# Patient Record
Sex: MALE | Race: BLACK OR AFRICAN AMERICAN
[De-identification: names, ages, dates, MRNs, and addresses within clinical notes are randomized per-mention and may not be internally consistent; named-entity substitution may affect disease eponyms.]

---

## 2018-11-05 ENCOUNTER — HOSPITAL ENCOUNTER (EMERGENCY)
Dept: HOSPITAL 74 - JER | Age: 23
Discharge: LEFT BEFORE DISCHARGE | End: 2018-11-05
Payer: COMMERCIAL

## 2018-11-05 VITALS — DIASTOLIC BLOOD PRESSURE: 68 MMHG | SYSTOLIC BLOOD PRESSURE: 114 MMHG | HEART RATE: 68 BPM | TEMPERATURE: 98.2 F

## 2018-11-05 VITALS — BODY MASS INDEX: 22.3 KG/M2

## 2018-11-05 DIAGNOSIS — Z53.21: Primary | ICD-10-CM

## 2019-10-28 ENCOUNTER — HOSPITAL ENCOUNTER (EMERGENCY)
Dept: HOSPITAL 74 - JERFT | Age: 24
Discharge: HOME | End: 2019-10-28
Payer: COMMERCIAL

## 2019-10-28 VITALS — HEART RATE: 62 BPM | SYSTOLIC BLOOD PRESSURE: 105 MMHG | TEMPERATURE: 98.2 F | DIASTOLIC BLOOD PRESSURE: 59 MMHG

## 2019-10-28 VITALS — BODY MASS INDEX: 20.9 KG/M2

## 2019-10-28 DIAGNOSIS — L70.0: Primary | ICD-10-CM

## 2019-10-28 DIAGNOSIS — R51: ICD-10-CM

## 2019-10-28 NOTE — PDOC
History of Present Illness





- General


Chief Complaint: Wound


Stated Complaint: BOIL


Time Seen by Provider: 10/28/19 16:15


History Source: Patient


Exam Limitations: No Limitations





- History of Present Illness


Initial Comments: 





10/28/19 17:07


24 year old male with significant medical or surgical history presents to 

emergency room for lesion to right side of face x 2 1/2 months.  Patient 

reports no fever or chills, States area appears to becoming more infected.  

Using no medication so far.  


Timing/Duration: reports: other (2-3 months)


Severity: Yes: mild


Location: reports: face


Respiratory Risk Factors: reports: no cause identified


Modifying Factors: improves with: scratching


Associated Symptoms: reports: swelling/mass/lumps (to right side of face)





Past History





- Travel


Traveled outside of the country in the last 30 days: No


Close contact w/someone who was outside of country & ill: No





- Past Medical History


Allergies/Adverse Reactions: 


 Allergies











Allergy/AdvReac Type Severity Reaction Status Date / Time


 


No Known Allergies Allergy   Verified 10/28/19 16:17











Home Medications: 


Ambulatory Orders





Clindamycin [Cleocin -] 300 mg PO TID #21 capsule 10/28/19 








COPD: No





- Psycho Social/Smoking Cessation Hx


Smoking History: Never smoked


Hx Alcohol Use: No


Drug/Substance Use Hx: No


Substance Use Type: Marijuana





**Review of Systems





- Review of Systems


Able to Perform ROS?: Yes


Is the patient limited English proficient: No


Constitutional: No: Chills, Fever, Malaise


HEENTM: No: Throat Pain, Throat Swelling, Mouth Pain


Respiratory: No: Orthopnea, Shortness of Breath, Wheezing


Cardiac (ROS): No: Edema, Palpitations


ABD/GI: No: Nausea, Poor Appetite


: No: Burning, Hematuria


Integumentary: Yes: Other


Neurological: No: Seizure, Tingling, Tremors, Weakness


Endocrine: No: Intolerance to Heat





*Physical Exam





- Vital Signs


 Last Vital Signs











Temp Pulse Resp BP Pulse Ox


 


 98.2 F   62   16   105/59 L  99 


 


 10/28/19 16:17  10/28/19 16:17  10/28/19 16:17  10/28/19 16:17  10/28/19 16:17














- Physical Exam


General Appearance: Yes: Nourished, Appropriately Dressed


HEENT: positive: ANDREY, TMs Normal, Pharynx Normal


Neck: positive: Supple.  negative: Lymphadenopathy (R), Lymphadenopathy (L)


Respiratory/Chest: positive: Lungs Clear


Cardiovascular: positive: Regular Rhythm, Regular Rate


Gastrointestinal/Abdominal: negative: Rebound, Tenderness


Musculoskeletal: negative: CVA Tenderness


Extremity: positive: Normal Capillary Refill


Integumentary: positive: Other (+soft mobile cyst on right cheek, draining 

clear liquid, acne all over face)


Neurologic: positive: Fully Oriented, Alert





Medical Decision Making





- Medical Decision Making





10/28/19 17:11


24 year old male with significant medical or surgical history presents to 

emergency room for lesion to right side of face x 2 1/2 months..





#Acne


-Rx: clindamycin 


-referred to dermatologist


10/28/19 17:21


-rx: clindamycin


10/28/19 17:21





#headache 


-acetaminophen 





Discharge





- Discharge Information


Problems reviewed: Yes


Clinical Impression/Diagnosis: 


 Acne cystica





Headache


Qualifiers:


 Headache type: unspecified Headache chronicity pattern: acute headache 

Intractability: not intractable Qualified Code(s): R51 - Headache





Condition: Good


Disposition: HOME





- Admission


No





- Additional Discharge Information


Prescriptions: 


Clindamycin [Cleocin -] 300 mg PO TID #21 capsule





- Follow up/Referral


Referrals: 


April Rodney MD [Staff Physician] - 


(call for appointment


)





- Patient Discharge Instructions


Patient Printed Discharge Instructions:  Acne, DI for Skin Lesion Removal


Additional Instructions: 


-Warm compress to area for 20 minutes 3 to 4 times daily 


-Return to emergency department in 3 days for wound check 


-Take antibiotics as prescribed until completed


-Call dermatologist for follow up appointment 








- Post Discharge Activity


Work/Back to School Note:  Back to Work

## 2019-10-28 NOTE — PDOC
Rapid Medical Evaluation


Time Seen by Provider: 10/28/19 16:15


Medical Evaluation: 


 Allergies











Allergy/AdvReac Type Severity Reaction Status Date / Time


 


No Known Allergies Allergy   Verified 11/05/18 13:34











10/28/19 16:15


HPI: Break out on face and headache 





PE: Multiple bulla on face    





ORDERS: Nothing





**Discharge Disposition





- Diagnosis


 Headache








- Referrals





- Patient Instructions





- Post Discharge Activity

## 2019-11-01 ENCOUNTER — HOSPITAL ENCOUNTER (EMERGENCY)
Dept: HOSPITAL 74 - JERFT | Age: 24
Discharge: HOME | End: 2019-11-01
Payer: COMMERCIAL

## 2019-11-01 VITALS — SYSTOLIC BLOOD PRESSURE: 120 MMHG | TEMPERATURE: 98.4 F | DIASTOLIC BLOOD PRESSURE: 71 MMHG | HEART RATE: 70 BPM

## 2019-11-01 VITALS — BODY MASS INDEX: 20.3 KG/M2

## 2019-11-01 DIAGNOSIS — Z48.01: Primary | ICD-10-CM

## 2019-11-01 NOTE — PDOC
History of Present Illness





- General


Chief Complaint: Revisit,Wound Recheck


Stated Complaint: WOUND CHECK


Time Seen by Provider: 11/01/19 12:15


History Source: Patient


Exam Limitations: No Limitations





Past History





- Past Medical History


Allergies/Adverse Reactions: 


 Allergies











Allergy/AdvReac Type Severity Reaction Status Date / Time


 


No Known Allergies Allergy   Verified 11/01/19 12:14











Home Medications: 


Ambulatory Orders





Clindamycin [Cleocin -] 300 mg PO TID #21 capsule 10/28/19 








COPD: No





- Psycho Social/Smoking Cessation Hx


Smoking History: Never smoked


Hx Alcohol Use: No


Drug/Substance Use Hx: Yes (marijuana)


Substance Use Type: Marijuana





*Physical Exam





- Vital Signs


 Last Vital Signs











Temp Pulse Resp BP Pulse Ox


 


 98.4 F   70   18   120/71   99 


 


 11/01/19 12:15  11/01/19 12:15  11/01/19 12:15  11/01/19 12:15  11/01/19 12:15














- Physical Exam


General Appearance: No: Apparent Distress


HEENT: positive: Other (1x1 cm swelling along R cheek, red base, soft to touch, 

no further drainage noted, no surrounding erythema, no streaking)


Neurologic: positive: Alert, Normal Mood/Affect





Medical Decision Making





- Medical Decision Making








23 y/o M presents for wound check. Patient was seen here 10/28 for bump along R 

cheek and was placed on Clindamycin. Patient has been doing warm compresses and 

taking the antibiotics as prescribed. Had pus drainage from site. States the 

site has gotten significantly smaller. Has appt with derm on 11/4. Denies fever





Improvement of wound


advised to continue abx


stable for dc





11/01/19 12:29








Discharge





- Discharge Information


Problems reviewed: Yes


Clinical Impression/Diagnosis: 


 Visit for wound check





Disposition: HOME





- Admission


No





- Additional Discharge Information


Prescription Drug Monitoring Program (I-STOP) results: I-STOP not reviewed





- Follow up/Referral





- Patient Discharge Instructions


Patient Printed Discharge Instructions:  DI for Wound Infection


Additional Instructions: 





Thank you for choosing [facility].  It was a pleasure taking 

care of you.  





Please continue the antibiotics as prescribed


Continue warm compresses


Continue follow-up with your dermatologist





Return to the Emergency Department if your symptoms worsen or persist, you have 

fever, redness, streaking or other concerning symptoms. 





- Post Discharge Activity

## 2019-12-10 ENCOUNTER — HOSPITAL ENCOUNTER (EMERGENCY)
Dept: HOSPITAL 74 - JER | Age: 24
Discharge: HOME | End: 2019-12-10
Payer: COMMERCIAL

## 2019-12-10 VITALS — BODY MASS INDEX: 20.2 KG/M2

## 2019-12-10 VITALS — SYSTOLIC BLOOD PRESSURE: 117 MMHG | TEMPERATURE: 97.8 F | DIASTOLIC BLOOD PRESSURE: 77 MMHG | HEART RATE: 70 BPM

## 2019-12-10 DIAGNOSIS — F41.9: Primary | ICD-10-CM

## 2019-12-10 NOTE — PDOC
History of Present Illness





- General


Chief Complaint: Chest Pain


Stated Complaint: ANXIETY ATTACK


Time Seen by Provider: 12/10/19 20:55





- History of Present Illness


Initial Comments: 





12/10/19 21:20


24-year-old male without comorbidities past medical history of anxiety 

intermittently presents for evaluation of chest pain which has since resolved 

since his presentation in the emergency room.  He feels this was an anxiety 

attack but has now resolved he is unsure of the precipitating stressor.





Past History





- Past Medical History


Allergies/Adverse Reactions: 


 Allergies











Allergy/AdvReac Type Severity Reaction Status Date / Time


 


No Known Allergies Allergy   Verified 12/10/19 20:21











Home Medications: 


Ambulatory Orders





Sulfamethoxazole/Trimethoprim [Bactrim Ds -] 1 tab PO BID #14 tablet 11/01/19 








COPD: No





- Immunization History


Immunization Up to Date: Yes





- Psycho Social/Smoking Cessation Hx


Smoking History: Never smoked


Have you smoked in the past 12 months: No


Information on smoking cessation initiated: No


Hx Alcohol Use: Yes (SOCIAL)


Drug/Substance Use Hx: No


Substance Use Type: Marijuana





**Review of Systems





- Review of Systems


Cardiac (ROS): Yes: Chest Pain


Psychiatric: Yes: Anxiety





*Physical Exam





- Vital Signs


 Last Vital Signs











Temp Pulse Resp BP Pulse Ox


 


 97.8 F   70   17   117/77   99 


 


 12/10/19 20:16  12/10/19 20:16  12/10/19 20:16  12/10/19 20:16  12/10/19 20:16














- Physical Exam





12/10/19 21:21


GENERAL: The patient is awake, alert, and fully oriented, in no acute distress.


HEAD: Normal with no signs of trauma.


EYES:  sclera anicteric, conjunctiva clear.


ENT: Ears normal tympanic membranes normal oropharynx clear uvula midline


NECK: Normal range of motion


LUNGS: Breath sounds equal, clear to auscultation bilaterally.  No wheezes, and 

no crackles.


HEART: S1 and S2 without murmur, rub or gallop.


ABDOMEN: Soft, nontender, normoactive bowel sounds.  No guarding, no rebound.  

No masses.


EXTREMITIES: Normal range of motion, no edema.  No clubbing or cyanosis. No 

cords, erythema, or tenderness.


NEUROLOGICAL: Cranial nerves II through XII grossly intact.  Normal speech, 

normal gait.


PSYCH: Normal mood, normal affect.


SKIN: Warm, Dry, normal turgor, no rashes or lesions noted.


No suicidal or homicidal ideation





ED Treatment Course





- RADIOLOGY


Radiology Studies Ordered: 














 Category Date Time Status


 


 CHEST PA & LAT [RAD] Stat Radiology  12/10/19 20:55 Taken














Medical Decision Making





- Medical Decision Making





12/10/19 21:21


Anxiety resolved follow-up with internal medicine.





Discharge





- Discharge Information


Problems reviewed: Yes


Clinical Impression/Diagnosis: 


 Anxiety





Condition: Stable


Disposition: HOME





- Admission


No





- Follow up/Referral


Referrals: 


Minh Conklin MD [Staff Physician] - 





- Patient Discharge Instructions


Patient Printed Discharge Instructions:  DI for Anxiety -- Adult


Additional Instructions: 


Return to the emergency room for further issues.  Please follow-up with 

internal medicine in 2 to 3 days for further evaluation and treatment options.  

Follow-up with internal medicine without fail.





- Post Discharge Activity

## 2019-12-11 NOTE — EKG
Test Reason : 

Blood Pressure : ***/*** mmHG

Vent. Rate : 064 BPM     Atrial Rate : 064 BPM

   P-R Int : 124 ms          QRS Dur : 106 ms

    QT Int : 386 ms       P-R-T Axes : 051 066 047 degrees

   QTc Int : 398 ms

 

NORMAL SINUS RHYTHM WITH SINUS ARRHYTHMIA

NORMAL ECG

NO PREVIOUS ECGS AVAILABLE

Confirmed by JOEL SANDOVAL, HARVEY ([id number]) on 12/11/2019 10:25:37 AM

 

Referred By:             Confirmed By:HARVEY MALDONADO MD

## 2022-04-28 ENCOUNTER — HOSPITAL ENCOUNTER (EMERGENCY)
Dept: HOSPITAL 74 - FER | Age: 27
Discharge: HOME | End: 2022-04-28
Payer: COMMERCIAL

## 2022-04-28 VITALS — DIASTOLIC BLOOD PRESSURE: 70 MMHG | HEART RATE: 60 BPM | TEMPERATURE: 98 F | SYSTOLIC BLOOD PRESSURE: 112 MMHG

## 2022-04-28 VITALS — BODY MASS INDEX: 19.3 KG/M2

## 2022-04-28 DIAGNOSIS — M94.0: Primary | ICD-10-CM
